# Patient Record
(demographics unavailable — no encounter records)

---

## 2025-03-05 NOTE — PLAN
[FreeTextEntry1] : # dysuria  - UA  # vaginal discharge  - affirm test # CPE  - blood work  - sti testing  - GYN for pap

## 2025-03-05 NOTE — HEALTH RISK ASSESSMENT
[Very Good] : ~his/her~  mood as very good [No] : No [0] : 2) Feeling down, depressed, or hopeless: Not at all (0) [PHQ-2 Negative - No further assessment needed] : PHQ-2 Negative - No further assessment needed [HIV Test offered] : HIV Test offered [Hepatitis C test offered] : Hepatitis C test offered [# of Members in Household ___] :  household currently consist of [unfilled] member(s) [Employed] : employed [Single] : single [Sexually Active] : sexually active [Feels Safe at Home] : Feels safe at home [Fully functional (bathing, dressing, toileting, transferring, walking, feeding)] : Fully functional (bathing, dressing, toileting, transferring, walking, feeding) [Fully functional (using the telephone, shopping, preparing meals, housekeeping, doing laundry, using] : Fully functional and needs no help or supervision to perform IADLs (using the telephone, shopping, preparing meals, housekeeping, doing laundry, using transportation, managing medications and managing finances) [Never] : Never [[id number]] : 0

## 2025-03-05 NOTE — HISTORY OF PRESENT ILLNESS
[FreeTextEntry1] : NP - CPE  [de-identified] : 28 yo hx asthma here for CPE.  having frequent urination.  having vaginal discharge whitish.  hx frequent UTI/BV.  hx breast cancer in the family and cervical cancer